# Patient Record
Sex: FEMALE | Race: WHITE | NOT HISPANIC OR LATINO | Employment: OTHER | URBAN - NONMETROPOLITAN AREA
[De-identification: names, ages, dates, MRNs, and addresses within clinical notes are randomized per-mention and may not be internally consistent; named-entity substitution may affect disease eponyms.]

---

## 2024-10-23 ENCOUNTER — OFFICE VISIT (OUTPATIENT)
Dept: CARDIOLOGY | Facility: CLINIC | Age: 59
End: 2024-10-23
Payer: MEDICARE

## 2024-10-23 VITALS
BODY MASS INDEX: 26.46 KG/M2 | SYSTOLIC BLOOD PRESSURE: 123 MMHG | WEIGHT: 155 LBS | HEART RATE: 68 BPM | DIASTOLIC BLOOD PRESSURE: 75 MMHG | OXYGEN SATURATION: 98 % | HEIGHT: 64 IN

## 2024-10-23 DIAGNOSIS — I25.10 CORONARY ARTERY DISEASE INVOLVING NATIVE HEART, UNSPECIFIED VESSEL OR LESION TYPE, UNSPECIFIED WHETHER ANGINA PRESENT: ICD-10-CM

## 2024-10-23 DIAGNOSIS — R07.9 CHEST PAIN, UNSPECIFIED TYPE: Primary | ICD-10-CM

## 2024-10-23 DIAGNOSIS — R06.02 SHORTNESS OF BREATH: ICD-10-CM

## 2024-10-23 PROCEDURE — 99204 OFFICE O/P NEW MOD 45 MIN: CPT | Performed by: PHYSICIAN ASSISTANT

## 2024-10-23 PROCEDURE — 93000 ELECTROCARDIOGRAM COMPLETE: CPT | Performed by: PHYSICIAN ASSISTANT

## 2024-10-23 RX ORDER — NITROGLYCERIN 0.4 MG/1
TABLET SUBLINGUAL
Qty: 25 TABLET | Refills: 11 | Status: SHIPPED | OUTPATIENT
Start: 2024-10-23

## 2024-10-23 RX ORDER — ALBUTEROL SULFATE 90 UG/1
INHALANT RESPIRATORY (INHALATION)
COMMUNITY
Start: 2024-09-18

## 2024-10-23 RX ORDER — GABAPENTIN 300 MG/1
CAPSULE ORAL DAILY
COMMUNITY
Start: 2024-09-25

## 2024-10-23 RX ORDER — AMLODIPINE BESYLATE 5 MG/1
5 TABLET ORAL DAILY
COMMUNITY

## 2024-10-23 RX ORDER — PRAVASTATIN SODIUM 40 MG
40 TABLET ORAL NIGHTLY
Qty: 90 TABLET | Refills: 3 | Status: SHIPPED | OUTPATIENT
Start: 2024-10-23

## 2024-10-23 RX ORDER — FAMOTIDINE 40 MG/1
TABLET, FILM COATED ORAL
COMMUNITY
Start: 2024-10-03

## 2024-10-23 RX ORDER — ASPIRIN 81 MG/1
81 TABLET ORAL DAILY
Qty: 30 TABLET | Refills: 5 | Status: SHIPPED | OUTPATIENT
Start: 2024-10-23

## 2024-10-23 NOTE — PROGRESS NOTES
Problem list     Subjective   Lucy Mac is a 59 y.o. female     Chief Complaint   Patient presents with    Establish Care     Transferred care. HX heart attack/stenting   Problem list  1.  Coronary artery disease  1.1 history of myocardial infarction's, inadequate data  1.2 cardiac catheterizations in 2013 by Dr. Marquez with stenting x 3, myocardial bridging noted.  Per patient report, inadequate data  2.  Hypertension  3.  Dyslipidemia    HPI    Patient is a 59-year-old female that presents to the office for evaluation.  She has history of coronary disease and previously seen Dr. Marquez here in [place].    Patient wants to establish care locally but has been experiencing worsening fatigue.  She describes concerned about her levels of fatigue which seem to be quite significant and have been that way since her myocardial infarction in the past.    Patient complains of occasional substernal discomfort in her chest.  She does not describe any severe symptoms but still noticeable discomfort accompanied with levels of dyspnea that is noticeable.  She does not describe PND or orthopnea.    She does not describe any significant palpitations.  She might occasionally sense a flutter type sensation but it is rare.  No complaints of dizziness, presyncope, or syncope.  She is stable otherwise.      Current Outpatient Medications on File Prior to Visit   Medication Sig Dispense Refill    albuterol sulfate  (90 Base) MCG/ACT inhaler       diphenhydrAMINE-APAP, sleep, (TYLENOL PM EXTRA STRENGTH PO) Take  by mouth.      famotidine (PEPCID) 40 MG tablet       gabapentin (NEURONTIN) 300 MG capsule Daily.      Zolpidem Tartrate (AMBIEN PO) Take  by mouth.      amLODIPine (NORVASC) 5 MG tablet Take 1 tablet by mouth Daily.       No current facility-administered medications on file prior to visit.       Patient has no known allergies.    Past Medical History:   Diagnosis Date    Chronic fatigue     Fibromyalgia     Heart  "attack        Social History     Socioeconomic History    Marital status:    Tobacco Use    Smoking status: Former     Types: Cigarettes    Smokeless tobacco: Never   Substance and Sexual Activity    Alcohol use: Yes    Drug use: Never    Sexual activity: Defer       Family History   Problem Relation Age of Onset    Heart attack Mother     Atrial fibrillation Father        Review of Systems   Constitutional: Negative.  Negative for activity change, appetite change, chills, fatigue and fever.   HENT: Negative.  Negative for congestion, sinus pressure and sinus pain.    Eyes: Negative.  Negative for visual disturbance.   Respiratory:  Positive for shortness of breath (asthma). Negative for apnea, cough, chest tightness and wheezing.    Cardiovascular:  Positive for chest pain (occasional- hx angina) and palpitations. Negative for leg swelling.   Gastrointestinal: Negative.  Negative for blood in stool.   Endocrine: Negative.  Negative for cold intolerance and heat intolerance.   Genitourinary:  Positive for hematuria (Current UTI).   Musculoskeletal: Negative.  Negative for gait problem.   Skin: Negative.  Negative for color change, rash and wound.   Allergic/Immunologic: Negative.  Negative for environmental allergies and food allergies.   Neurological: Negative.  Negative for dizziness, syncope, weakness, light-headedness, numbness and headaches.   Hematological: Negative.  Does not bruise/bleed easily.   Psychiatric/Behavioral: Negative.  Negative for sleep disturbance.        Objective   Vitals:    10/23/24 1034   BP: 123/75   BP Location: Right arm   Patient Position: Sitting   Cuff Size: Adult   Pulse: 68   SpO2: 98%   Weight: 70.3 kg (155 lb)   Height: 162.6 cm (64\")      /75 (BP Location: Right arm, Patient Position: Sitting, Cuff Size: Adult)   Pulse 68   Ht 162.6 cm (64\")   Wt 70.3 kg (155 lb)   SpO2 98%   BMI 26.61 kg/m²     Lab Results (most recent)       None            Physical " Exam  Vitals and nursing note reviewed.   Constitutional:       General: She is not in acute distress.     Appearance: Normal appearance. She is well-developed.   HENT:      Head: Normocephalic and atraumatic.   Eyes:      General: No scleral icterus.        Right eye: No discharge.         Left eye: No discharge.      Conjunctiva/sclera: Conjunctivae normal.   Neck:      Vascular: No carotid bruit.   Cardiovascular:      Rate and Rhythm: Normal rate and regular rhythm.      Heart sounds: Normal heart sounds. No murmur heard.     No friction rub. No gallop.   Pulmonary:      Effort: Pulmonary effort is normal. No respiratory distress.      Breath sounds: Normal breath sounds. No wheezing or rales.   Chest:      Chest wall: No tenderness.   Musculoskeletal:      Right lower leg: No edema.      Left lower leg: No edema.   Skin:     General: Skin is warm and dry.      Coloration: Skin is not pale.      Findings: No erythema or rash.   Neurological:      Mental Status: She is alert and oriented to person, place, and time.      Cranial Nerves: No cranial nerve deficit.   Psychiatric:         Behavior: Behavior normal.         Procedure     ECG 12 Lead    Date/Time: 10/23/2024 10:35 AM  Performed by: Nino York PA    Authorized by: Nino York PA  Comparison: not compared with previous ECG   Previous ECG: no previous ECG available  Comments: EKG demonstrates sinus rhythm at 66 bpm with no acute ST changes             Assessment & Plan     Problems Addressed this Visit          Cardiac and Vasculature    Chest pain - Primary    Relevant Orders    Adult Transthoracic Echo Complete W/ Cont if Necessary Per Protocol    Stress Test With Myocardial Perfusion One Day    Coronary artery disease involving native heart    Relevant Medications    amLODIPine (NORVASC) 5 MG tablet    nitroglycerin (NITROSTAT) 0.4 MG SL tablet    Other Relevant Orders    Adult Transthoracic Echo Complete W/ Cont if Necessary Per Protocol     Stress Test With Myocardial Perfusion One Day       Pulmonary and Pneumonias    Shortness of breath    Relevant Orders    Adult Transthoracic Echo Complete W/ Cont if Necessary Per Protocol    Stress Test With Myocardial Perfusion One Day     Diagnoses         Codes Comments    Chest pain, unspecified type    -  Primary ICD-10-CM: R07.9  ICD-9-CM: 786.50     Shortness of breath     ICD-10-CM: R06.02  ICD-9-CM: 786.05     Coronary artery disease involving native heart, unspecified vessel or lesion type, unspecified whether angina present     ICD-10-CM: I25.10  ICD-9-CM: 414.01           Recommendations  1.  Patient is a 59-year-old female presenting for evaluation.  She has known coronary disease with complaints of chest pain and dyspnea.  We would like to repeat testing to evaluate further.    2.  Nuclear stress test will be ordered for ischemia assessment.    3.  Echo will be ordered to evaluate LV systolic and diastolic performance, valvular structures etc.    4.  We are putting her back on aspirin daily.  We are putting her on statin therapy.  We discussed PCK S9 inhibitor such as Repatha.  Patient does not seem interested.  She apparently has tried atorvastatin and possibly rosuvastatin with adverse effects.  We will try pravastatin although we would recommend a higher dose statin.  We can repeat lipid parameters upon arrival.    5.  Any chest pain, not resolved with nitroglycerin, I recommend ER evaluation.  We will see her back for follow-up on above and recommend further.  Follow-up with primary as scheduled.         Patient did not bring med list or medicine bottles to appointment, med list has been reviewed and updated based on patient's knowledge of their meds.      Electronically signed by:

## 2024-10-23 NOTE — LETTER
October 23, 2024     Nishant Yi MD  [address] [place] [address] [address]  [address]  [facility] [id number]    Patient: Lucy Mac   YOB: 1965   Date of Visit: 10/23/2024       Dear Nishant Yi MD    Lucy Mac was in my office today. Below is a copy of my note.    If you have questions, please do not hesitate to call me. I look forward to following Lucy along with you.         Sincerely,        MATHEUS Sandra        CC: No Recipients    Problem list     Subjective  Lucy Mac is a 59 y.o. female     Chief Complaint   Patient presents with   • Establish Care     Transferred care. HX heart attack/stenting   Problem list  1.  Coronary artery disease  1.1 history of myocardial infarction's, inadequate data  1.2 cardiac catheterizations in 2013 by Dr. Marquez with stenting x 3, myocardial bridging noted.  Per patient report, inadequate data  2.  Hypertension  3.  Dyslipidemia    HPI    Patient is a 59-year-old female that presents to the office for evaluation.  She has history of coronary disease and previously seen Dr. Marquez here in [place].    Patient wants to establish care locally but has been experiencing worsening fatigue.  She describes concerned about her levels of fatigue which seem to be quite significant and have been that way since her myocardial infarction in the past.    Patient complains of occasional substernal discomfort in her chest.  She does not describe any severe symptoms but still noticeable discomfort accompanied with levels of dyspnea that is noticeable.  She does not describe PND or orthopnea.    She does not describe any significant palpitations.  She might occasionally sense a flutter type sensation but it is rare.  No complaints of dizziness, presyncope, or syncope.  She is stable otherwise.      Current Outpatient Medications on File Prior to Visit   Medication Sig Dispense Refill   • albuterol sulfate  (90 Base) MCG/ACT inhaler      • diphenhydrAMINE-APAP,  sleep, (TYLENOL PM EXTRA STRENGTH PO) Take  by mouth.     • famotidine (PEPCID) 40 MG tablet      • gabapentin (NEURONTIN) 300 MG capsule Daily.     • Zolpidem Tartrate (AMBIEN PO) Take  by mouth.     • amLODIPine (NORVASC) 5 MG tablet Take 1 tablet by mouth Daily.       No current facility-administered medications on file prior to visit.       Patient has no known allergies.    Past Medical History:   Diagnosis Date   • Chronic fatigue    • Fibromyalgia    • Heart attack        Social History     Socioeconomic History   • Marital status:    Tobacco Use   • Smoking status: Former     Types: Cigarettes   • Smokeless tobacco: Never   Substance and Sexual Activity   • Alcohol use: Yes   • Drug use: Never   • Sexual activity: Defer       Family History   Problem Relation Age of Onset   • Heart attack Mother    • Atrial fibrillation Father        Review of Systems   Constitutional: Negative.  Negative for activity change, appetite change, chills, fatigue and fever.   HENT: Negative.  Negative for congestion, sinus pressure and sinus pain.    Eyes: Negative.  Negative for visual disturbance.   Respiratory:  Positive for shortness of breath (asthma). Negative for apnea, cough, chest tightness and wheezing.    Cardiovascular:  Positive for chest pain (occasional- hx angina) and palpitations. Negative for leg swelling.   Gastrointestinal: Negative.  Negative for blood in stool.   Endocrine: Negative.  Negative for cold intolerance and heat intolerance.   Genitourinary:  Positive for hematuria (Current UTI).   Musculoskeletal: Negative.  Negative for gait problem.   Skin: Negative.  Negative for color change, rash and wound.   Allergic/Immunologic: Negative.  Negative for environmental allergies and food allergies.   Neurological: Negative.  Negative for dizziness, syncope, weakness, light-headedness, numbness and headaches.   Hematological: Negative.  Does not bruise/bleed easily.   Psychiatric/Behavioral: Negative.   "Negative for sleep disturbance.        Objective  Vitals:    10/23/24 1034   BP: 123/75   BP Location: Right arm   Patient Position: Sitting   Cuff Size: Adult   Pulse: 68   SpO2: 98%   Weight: 70.3 kg (155 lb)   Height: 162.6 cm (64\")      /75 (BP Location: Right arm, Patient Position: Sitting, Cuff Size: Adult)   Pulse 68   Ht 162.6 cm (64\")   Wt 70.3 kg (155 lb)   SpO2 98%   BMI 26.61 kg/m²     Lab Results (most recent)       None            Physical Exam  Vitals and nursing note reviewed.   Constitutional:       General: She is not in acute distress.     Appearance: Normal appearance. She is well-developed.   HENT:      Head: Normocephalic and atraumatic.   Eyes:      General: No scleral icterus.        Right eye: No discharge.         Left eye: No discharge.      Conjunctiva/sclera: Conjunctivae normal.   Neck:      Vascular: No carotid bruit.   Cardiovascular:      Rate and Rhythm: Normal rate and regular rhythm.      Heart sounds: Normal heart sounds. No murmur heard.     No friction rub. No gallop.   Pulmonary:      Effort: Pulmonary effort is normal. No respiratory distress.      Breath sounds: Normal breath sounds. No wheezing or rales.   Chest:      Chest wall: No tenderness.   Musculoskeletal:      Right lower leg: No edema.      Left lower leg: No edema.   Skin:     General: Skin is warm and dry.      Coloration: Skin is not pale.      Findings: No erythema or rash.   Neurological:      Mental Status: She is alert and oriented to person, place, and time.      Cranial Nerves: No cranial nerve deficit.   Psychiatric:         Behavior: Behavior normal.         Procedure    ECG 12 Lead    Date/Time: 10/23/2024 10:35 AM  Performed by: Nino York PA    Authorized by: Nino York PA  Comparison: not compared with previous ECG   Previous ECG: no previous ECG available  Comments: EKG demonstrates sinus rhythm at 66 bpm with no acute ST changes             Assessment & Plan    Problems " Addressed this Visit          Cardiac and Vasculature    Chest pain - Primary    Relevant Orders    Adult Transthoracic Echo Complete W/ Cont if Necessary Per Protocol    Stress Test With Myocardial Perfusion One Day    Coronary artery disease involving native heart    Relevant Medications    amLODIPine (NORVASC) 5 MG tablet    nitroglycerin (NITROSTAT) 0.4 MG SL tablet    Other Relevant Orders    Adult Transthoracic Echo Complete W/ Cont if Necessary Per Protocol    Stress Test With Myocardial Perfusion One Day       Pulmonary and Pneumonias    Shortness of breath    Relevant Orders    Adult Transthoracic Echo Complete W/ Cont if Necessary Per Protocol    Stress Test With Myocardial Perfusion One Day     Diagnoses         Codes Comments    Chest pain, unspecified type    -  Primary ICD-10-CM: R07.9  ICD-9-CM: 786.50     Shortness of breath     ICD-10-CM: R06.02  ICD-9-CM: 786.05     Coronary artery disease involving native heart, unspecified vessel or lesion type, unspecified whether angina present     ICD-10-CM: I25.10  ICD-9-CM: 414.01           Recommendations  1.  Patient is a 59-year-old female presenting for evaluation.  She has known coronary disease with complaints of chest pain and dyspnea.  We would like to repeat testing to evaluate further.    2.  Nuclear stress test will be ordered for ischemia assessment.    3.  Echo will be ordered to evaluate LV systolic and diastolic performance, valvular structures etc.    4.  We are putting her back on aspirin daily.  We are putting her on statin therapy.  We discussed PCK S9 inhibitor such as Repatha.  Patient does not seem interested.  She apparently has tried atorvastatin and possibly rosuvastatin with adverse effects.  We will try pravastatin although we would recommend a higher dose statin.  We can repeat lipid parameters upon arrival.    5.  Any chest pain, not resolved with nitroglycerin, I recommend ER evaluation.  We will see her back for follow-up on  above and recommend further.  Follow-up with primary as scheduled.         Patient did not bring med list or medicine bottles to appointment, med list has been reviewed and updated based on patient's knowledge of their meds.      Electronically signed by:

## 2025-01-31 ENCOUNTER — HOSPITAL ENCOUNTER (OUTPATIENT)
Dept: CARDIOLOGY | Facility: HOSPITAL | Age: 60
Discharge: HOME | End: 2025-01-31
Payer: COMMERCIAL

## 2025-01-31 DIAGNOSIS — R06.02 SHORTNESS OF BREATH: ICD-10-CM

## 2025-01-31 DIAGNOSIS — I25.10 CORONARY ARTERY DISEASE INVOLVING NATIVE HEART, UNSPECIFIED VESSEL OR LESION TYPE, UNSPECIFIED WHETHER ANGINA PRESENT: ICD-10-CM

## 2025-01-31 DIAGNOSIS — R07.9 CHEST PAIN, UNSPECIFIED TYPE: ICD-10-CM

## 2025-01-31 LAB
AV MEAN PRESS GRAD SYS DOP V1V2: 4.1 MMHG
AV VMAX SYS DOP: 135.7 CM/SEC
BH CV ECHO MEAS - ACS: 1.97 CM
BH CV ECHO MEAS - AO MAX PG: 7.4 MMHG
BH CV ECHO MEAS - AO ROOT DIAM: 2.9 CM
BH CV ECHO MEAS - AO V2 VTI: 34.2 CM
BH CV ECHO MEAS - EDV(CUBED): 88.1 ML
BH CV ECHO MEAS - EDV(MOD-SP4): 126 ML
BH CV ECHO MEAS - EF(MOD-SP4): 61.3 %
BH CV ECHO MEAS - ESV(CUBED): 21.7 ML
BH CV ECHO MEAS - ESV(MOD-SP4): 48.7 ML
BH CV ECHO MEAS - FS: 37.3 %
BH CV ECHO MEAS - IVS/LVPW: 1.09 CM
BH CV ECHO MEAS - IVSD: 0.88 CM
BH CV ECHO MEAS - LA DIMENSION: 3.5 CM
BH CV ECHO MEAS - LAT PEAK E' VEL: 11.9 CM/SEC
BH CV ECHO MEAS - LV DIASTOLIC VOL/BSA (35-75): 71.8 CM2
BH CV ECHO MEAS - LV MASS(C)D: 118.7 GRAMS
BH CV ECHO MEAS - LV SYSTOLIC VOL/BSA (12-30): 27.7 CM2
BH CV ECHO MEAS - LVIDD: 4.5 CM
BH CV ECHO MEAS - LVIDS: 2.8 CM
BH CV ECHO MEAS - LVPWD: 0.8 CM
BH CV ECHO MEAS - MED PEAK E' VEL: 7.4 CM/SEC
BH CV ECHO MEAS - MV A MAX VEL: 59.6 CM/SEC
BH CV ECHO MEAS - MV DEC TIME: 0.3 SEC
BH CV ECHO MEAS - MV E MAX VEL: 66.8 CM/SEC
BH CV ECHO MEAS - MV E/A: 1.12
BH CV ECHO MEAS - RAP SYSTOLE: 10 MMHG
BH CV ECHO MEAS - RVDD: 3 CM
BH CV ECHO MEAS - RVSP: 35.2 MMHG
BH CV ECHO MEAS - SV(MOD-SP4): 77.3 ML
BH CV ECHO MEAS - SVI(MOD-SP4): 44 ML/M2
BH CV ECHO MEAS - TR MAX PG: 25.2 MMHG
BH CV ECHO MEAS - TR MAX VEL: 251.1 CM/SEC
BH CV ECHO MEASUREMENTS AVERAGE E/E' RATIO: 6.92
LEFT ATRIUM VOLUME INDEX: 26.9 ML/M2
LV EF 3D SEGMENTATION: 62 %

## 2025-01-31 PROCEDURE — 34310000005 TECHNETIUM SESTAMIBI: Performed by: INTERNAL MEDICINE

## 2025-01-31 PROCEDURE — A9500 TC99M SESTAMIBI: HCPCS | Performed by: INTERNAL MEDICINE

## 2025-01-31 PROCEDURE — 78452 HT MUSCLE IMAGE SPECT MULT: CPT

## 2025-01-31 PROCEDURE — 93306 TTE W/DOPPLER COMPLETE: CPT

## 2025-01-31 PROCEDURE — 93017 CV STRESS TEST TRACING ONLY: CPT

## 2025-01-31 PROCEDURE — 25010000002 REGADENOSON 0.4 MG/5ML SOLUTION: Performed by: INTERNAL MEDICINE

## 2025-01-31 RX ORDER — REGADENOSON 0.08 MG/ML
0.4 INJECTION, SOLUTION INTRAVENOUS
Status: COMPLETED | OUTPATIENT
Start: 2025-01-31 | End: 2025-01-31

## 2025-01-31 RX ADMIN — REGADENOSON 0.4 MG: 0.08 INJECTION, SOLUTION INTRAVENOUS at 09:38

## 2025-01-31 RX ADMIN — TECHNETIUM TC 99M SESTAMIBI 1 DOSE: 1 INJECTION INTRAVENOUS at 09:38

## 2025-01-31 RX ADMIN — TECHNETIUM TC 99M SESTAMIBI 1 DOSE: 1 INJECTION INTRAVENOUS at 08:18

## 2025-02-02 LAB
BH CV REST NUCLEAR ISOTOPE DOSE: 10 MCI
BH CV STRESS COMMENTS STAGE 1: NORMAL
BH CV STRESS DOSE REGADENOSON STAGE 1: 0.4
BH CV STRESS DURATION MIN STAGE 1: 0
BH CV STRESS DURATION SEC STAGE 1: 10
BH CV STRESS NUCLEAR ISOTOPE DOSE: 30 MCI
BH CV STRESS PROTOCOL 1: NORMAL
BH CV STRESS RECOVERY BP: NORMAL MMHG
BH CV STRESS RECOVERY HR: 78 BPM
BH CV STRESS STAGE 1: 1
MAXIMAL PREDICTED HEART RATE: 162 BPM
PERCENT MAX PREDICTED HR: 53.09 %
STRESS BASELINE BP: NORMAL MMHG
STRESS BASELINE HR: 62 BPM
STRESS PERCENT HR: 62 %
STRESS POST PEAK BP: NORMAL MMHG
STRESS POST PEAK HR: 86 BPM
STRESS TARGET HR: 138 BPM

## 2025-02-03 ENCOUNTER — TELEPHONE (OUTPATIENT)
Dept: CARDIOLOGY | Facility: CLINIC | Age: 60
End: 2025-02-03
Payer: COMMERCIAL

## 2025-02-03 NOTE — TELEPHONE ENCOUNTER
--Stress Test With Myocardial Perfusion One Day --- Message from Nino York sent at 2/3/2025  8:59 AM EST -----  1 to 2-week follow-up      Patient aware will be called with melina time and date

## 2025-02-10 ENCOUNTER — TELEPHONE (OUTPATIENT)
Dept: CARDIOLOGY | Facility: CLINIC | Age: 60
End: 2025-02-10
Payer: COMMERCIAL

## 2025-02-10 NOTE — TELEPHONE ENCOUNTER
RELAY  ECHO  Called patient to notify of no acute findings or abnormalities. Keep follow up as scheduled. If you have any problem between now and then give our office a call.   ----- Message from Saima HUNT sent at 2/10/2025 10:54 AM EST -----    ----- Message -----  From: Nino York PA  Sent: 2/10/2025  10:30 AM EST  To: Saima Ding MA    Routine follow-up

## 2025-02-10 NOTE — TELEPHONE ENCOUNTER
Name: OBDULIO VALLEJO      Relationship: SELF      Best Callback Number: [phone]      HUB PROVIDED THE RELAY MESSAGE FROM THE OFFICE      PATIENT: VOICED UNDERSTANDING AND HAS NO FURTHER QUESTIONS AT THIS TIME    ADDITIONAL INFORMATION:

## 2025-02-12 ENCOUNTER — OFFICE VISIT (OUTPATIENT)
Dept: CARDIOLOGY | Facility: CLINIC | Age: 60
End: 2025-02-12
Payer: MEDICARE

## 2025-02-12 VITALS
DIASTOLIC BLOOD PRESSURE: 75 MMHG | HEART RATE: 76 BPM | SYSTOLIC BLOOD PRESSURE: 119 MMHG | OXYGEN SATURATION: 97 % | WEIGHT: 157 LBS | BODY MASS INDEX: 26.95 KG/M2

## 2025-02-12 DIAGNOSIS — R07.9 CHEST PAIN, UNSPECIFIED TYPE: Primary | ICD-10-CM

## 2025-02-12 DIAGNOSIS — I10 PRIMARY HYPERTENSION: ICD-10-CM

## 2025-02-12 DIAGNOSIS — I25.10 CORONARY ARTERY DISEASE INVOLVING NATIVE HEART, UNSPECIFIED VESSEL OR LESION TYPE, UNSPECIFIED WHETHER ANGINA PRESENT: ICD-10-CM

## 2025-02-12 PROCEDURE — 3074F SYST BP LT 130 MM HG: CPT | Performed by: PHYSICIAN ASSISTANT

## 2025-02-12 PROCEDURE — 3078F DIAST BP <80 MM HG: CPT | Performed by: PHYSICIAN ASSISTANT

## 2025-02-12 PROCEDURE — 99214 OFFICE O/P EST MOD 30 MIN: CPT | Performed by: PHYSICIAN ASSISTANT

## 2025-02-12 RX ORDER — PRAVASTATIN SODIUM 20 MG
20 TABLET ORAL NIGHTLY
Qty: 90 TABLET | Refills: 3 | Status: SHIPPED | OUTPATIENT
Start: 2025-02-12

## 2025-02-12 NOTE — LETTER
February 12, 2025     Nishant Yi MD  [address] [place] [address]  [address]  [facility] [address]    Patient: Lucy Mac   YOB: 1965   Date of Visit: 2/12/2025       Dear Nishant Yi MD    Lucy Mac was in my office today. Below is a copy of my note.    If you have questions, please do not hesitate to call me. I look forward to following Lucy along with you.         Sincerely,        MATHEUS Sandra        CC: No Recipients    Problem list     Subjective  Lucy Mac is a 59 y.o. female     Chief Complaint   Patient presents with   • Abnormal testing     Chest pain   Problem list  1.  Coronary artery disease  1.1 history of myocardial infarction's, inadequate data  1.2 cardiac catheterizations in 2013 by Dr. Marquez with stenting x 3, myocardial bridging noted.  Per patient report, inadequate data  1.3 stress test February 2025 with a large defect involving anteroseptal, anterior and anterolateral wall.  Attenuation versus ischemia with normal systolic function noted  1.4  continued symptoms on antianginal therapy  2.  Hypertension  3.  Dyslipidemia    HPI    Patient is a 59-year-old female presenting to the office to be evaluated.  She has known coronary disease and history of stenting in the past.  She presented to the office complaining of chest discomfort and symptoms.  Because of it, we scheduled for testing which she is here today for follow-up.    She continues to feel discomfort in the substernal region.  She does not describe taking nitroglycerin but she continues to feel this substernal discomfort despite being on antianginal therapy.  Patient does not describe any progressive or severe exertional dyspnea.  She does not describe PND, orthopnea, or edema.    She may occasionally palpitate or sense of fluttering type sensation.  She does not describe any strokelike symptoms.    Overall, patient is stable.    Current Outpatient Medications on File Prior to Visit    Medication Sig Dispense Refill   • albuterol sulfate  (90 Base) MCG/ACT inhaler      • amLODIPine (NORVASC) 5 MG tablet Take 1 tablet by mouth Daily.     • aspirin 81 MG EC tablet Take 1 tablet by mouth Daily. 30 tablet 5   • diphenhydrAMINE-APAP, sleep, (TYLENOL PM EXTRA STRENGTH PO) Take  by mouth.     • famotidine (PEPCID) 40 MG tablet      • gabapentin (NEURONTIN) 300 MG capsule Daily.     • nitroglycerin (NITROSTAT) 0.4 MG SL tablet 1 under the tongue as needed for angina, may repeat q5mins for up three doses 25 tablet 11   • Zolpidem Tartrate (AMBIEN PO) Take  by mouth.     • [DISCONTINUED] pravastatin (Pravachol) 40 MG tablet Take 1 tablet by mouth Every Night. 90 tablet 3     No current facility-administered medications on file prior to visit.       Patient has no known allergies.    Past Medical History:   Diagnosis Date   • Chronic fatigue    • Fibromyalgia    • Heart attack        Social History     Socioeconomic History   • Marital status:    Tobacco Use   • Smoking status: Former     Types: Cigarettes   • Smokeless tobacco: Never   Substance and Sexual Activity   • Alcohol use: Yes   • Drug use: Never   • Sexual activity: Defer       Family History   Problem Relation Age of Onset   • Heart attack Mother    • Atrial fibrillation Father        Review of Systems   Constitutional:  Negative for activity change, appetite change, chills, fatigue and fever.   HENT: Negative.  Negative for congestion, sinus pressure and sinus pain.    Eyes: Negative.  Negative for visual disturbance.   Respiratory: Negative.  Negative for apnea, cough, chest tightness, shortness of breath and wheezing.    Cardiovascular:  Positive for chest pain and palpitations. Negative for leg swelling.   Gastrointestinal: Negative.  Negative for blood in stool.   Endocrine: Negative.  Negative for cold intolerance and heat intolerance.   Genitourinary: Negative.  Negative for hematuria.   Musculoskeletal: Negative.  Negative  for gait problem.   Skin: Negative.  Negative for color change, rash and wound.   Allergic/Immunologic: Negative.  Negative for environmental allergies and food allergies.   Neurological:  Negative for dizziness, syncope, weakness, light-headedness, numbness and headaches.   Hematological: Negative.  Does not bruise/bleed easily.   Psychiatric/Behavioral: Negative.  Negative for sleep disturbance.        Objective  Vitals:    02/12/25 0837   BP: 119/75   BP Location: Right arm   Patient Position: Sitting   Cuff Size: Adult   Pulse: 76   SpO2: 97%   Weight: 71.2 kg (157 lb)      /75 (BP Location: Right arm, Patient Position: Sitting, Cuff Size: Adult)   Pulse 76   Wt 71.2 kg (157 lb)   SpO2 97%   BMI 26.95 kg/m²     Lab Results (most recent)       None            Physical Exam  Vitals and nursing note reviewed.   Constitutional:       General: She is not in acute distress.     Appearance: Normal appearance. She is well-developed.   HENT:      Head: Normocephalic and atraumatic.   Eyes:      General: No scleral icterus.        Right eye: No discharge.         Left eye: No discharge.      Conjunctiva/sclera: Conjunctivae normal.   Neck:      Vascular: No carotid bruit.   Cardiovascular:      Rate and Rhythm: Normal rate and regular rhythm.      Heart sounds: Normal heart sounds. No murmur heard.     No friction rub. No gallop.   Pulmonary:      Effort: Pulmonary effort is normal. No respiratory distress.      Breath sounds: Normal breath sounds. No wheezing or rales.   Chest:      Chest wall: No tenderness.   Musculoskeletal:      Right lower leg: No edema.      Left lower leg: No edema.   Skin:     General: Skin is warm and dry.      Coloration: Skin is not pale.      Findings: No erythema or rash.   Neurological:      Mental Status: She is alert and oriented to person, place, and time.      Cranial Nerves: No cranial nerve deficit.   Psychiatric:         Behavior: Behavior normal.          Procedure  Procedures       Assessment & Plan    Problems Addressed this Visit          Cardiac and Vasculature    Chest pain - Primary    Relevant Orders    Case Request Cath Lab: Coronary angiography (Completed)    Coronary artery disease involving native heart    Relevant Orders    Case Request Cath Lab: Coronary angiography (Completed)    Primary hypertension    Relevant Orders    Case Request Cath Lab: Coronary angiography (Completed)     Diagnoses         Codes Comments    Chest pain, unspecified type    -  Primary ICD-10-CM: R07.9  ICD-9-CM: 786.50     Coronary artery disease involving native heart, unspecified vessel or lesion type, unspecified whether angina present     ICD-10-CM: I25.10  ICD-9-CM: 414.01     Primary hypertension     ICD-10-CM: I10  ICD-9-CM: 401.9         Recommendations  1.  Patient is a 59-year-old female with chest discomfort with a large anterior, anteroseptal and anterolateral wall defect noted on stress testing with patient continuing to have symptoms on antianginal therapy.  Because of it, she would like cardiac catheterization and have it performed in [place].  We will try to make arrangements.    2.  Patient has had significant issues with statin therapy.  I am decreasing it to 20 mg.  We discussed PCKS9 but she is not interested.  We have her on a milder statin but she is having issues with it.  We will try to decrease it.  We may have to ultimately add Zetia.  We will decrease and evaluate response.    3.  Patient's blood pressure controlled on current medical regimen.  I will make no changes.    4.  We will see her back for follow-up after catheterization to recommend further.  If symptoms worsen, want her to call the office.  Follow-up with primary as scheduled.             Patient did not bring med list or medicine bottles to appointment, med list has been reviewed and updated based on patient's knowledge of their meds.      Electronically signed by:

## 2025-02-12 NOTE — H&P (VIEW-ONLY)
Problem list     Subjective   Lucy Mac is a 59 y.o. female     Chief Complaint   Patient presents with    Abnormal testing     Chest pain   Problem list  1.  Coronary artery disease  1.1 history of myocardial infarction's, inadequate data  1.2 cardiac catheterizations in 2013 by Dr. Marquez with stenting x 3, myocardial bridging noted.  Per patient report, inadequate data  1.3 stress test February 2025 with a large defect involving anteroseptal, anterior and anterolateral wall.  Attenuation versus ischemia with normal systolic function noted  1.4  continued symptoms on antianginal therapy  2.  Hypertension  3.  Dyslipidemia    HPI    Patient is a 59-year-old female presenting to the office to be evaluated.  She has known coronary disease and history of stenting in the past.  She presented to the office complaining of chest discomfort and symptoms.  Because of it, we scheduled for testing which she is here today for follow-up.    She continues to feel discomfort in the substernal region.  She does not describe taking nitroglycerin but she continues to feel this substernal discomfort despite being on antianginal therapy.  Patient does not describe any progressive or severe exertional dyspnea.  She does not describe PND, orthopnea, or edema.    She may occasionally palpitate or sense of fluttering type sensation.  She does not describe any strokelike symptoms.    Overall, patient is stable.    Current Outpatient Medications on File Prior to Visit   Medication Sig Dispense Refill    albuterol sulfate  (90 Base) MCG/ACT inhaler       amLODIPine (NORVASC) 5 MG tablet Take 1 tablet by mouth Daily.      aspirin 81 MG EC tablet Take 1 tablet by mouth Daily. 30 tablet 5    diphenhydrAMINE-APAP, sleep, (TYLENOL PM EXTRA STRENGTH PO) Take  by mouth.      famotidine (PEPCID) 40 MG tablet       gabapentin (NEURONTIN) 300 MG capsule Daily.      nitroglycerin (NITROSTAT) 0.4 MG SL tablet 1 under the tongue as needed for  angina, may repeat q5mins for up three doses 25 tablet 11    Zolpidem Tartrate (AMBIEN PO) Take  by mouth.      [DISCONTINUED] pravastatin (Pravachol) 40 MG tablet Take 1 tablet by mouth Every Night. 90 tablet 3     No current facility-administered medications on file prior to visit.       Patient has no known allergies.    Past Medical History:   Diagnosis Date    Chronic fatigue     Fibromyalgia     Heart attack        Social History     Socioeconomic History    Marital status:    Tobacco Use    Smoking status: Former     Types: Cigarettes    Smokeless tobacco: Never   Substance and Sexual Activity    Alcohol use: Yes    Drug use: Never    Sexual activity: Defer       Family History   Problem Relation Age of Onset    Heart attack Mother     Atrial fibrillation Father        Review of Systems   Constitutional:  Negative for activity change, appetite change, chills, fatigue and fever.   HENT: Negative.  Negative for congestion, sinus pressure and sinus pain.    Eyes: Negative.  Negative for visual disturbance.   Respiratory: Negative.  Negative for apnea, cough, chest tightness, shortness of breath and wheezing.    Cardiovascular:  Positive for chest pain and palpitations. Negative for leg swelling.   Gastrointestinal: Negative.  Negative for blood in stool.   Endocrine: Negative.  Negative for cold intolerance and heat intolerance.   Genitourinary: Negative.  Negative for hematuria.   Musculoskeletal: Negative.  Negative for gait problem.   Skin: Negative.  Negative for color change, rash and wound.   Allergic/Immunologic: Negative.  Negative for environmental allergies and food allergies.   Neurological:  Negative for dizziness, syncope, weakness, light-headedness, numbness and headaches.   Hematological: Negative.  Does not bruise/bleed easily.   Psychiatric/Behavioral: Negative.  Negative for sleep disturbance.        Objective   Vitals:    02/12/25 0837   BP: 119/75   BP Location: Right arm   Patient  Position: Sitting   Cuff Size: Adult   Pulse: 76   SpO2: 97%   Weight: 71.2 kg (157 lb)      /75 (BP Location: Right arm, Patient Position: Sitting, Cuff Size: Adult)   Pulse 76   Wt 71.2 kg (157 lb)   SpO2 97%   BMI 26.95 kg/m²     Lab Results (most recent)       None            Physical Exam  Vitals and nursing note reviewed.   Constitutional:       General: She is not in acute distress.     Appearance: Normal appearance. She is well-developed.   HENT:      Head: Normocephalic and atraumatic.   Eyes:      General: No scleral icterus.        Right eye: No discharge.         Left eye: No discharge.      Conjunctiva/sclera: Conjunctivae normal.   Neck:      Vascular: No carotid bruit.   Cardiovascular:      Rate and Rhythm: Normal rate and regular rhythm.      Heart sounds: Normal heart sounds. No murmur heard.     No friction rub. No gallop.   Pulmonary:      Effort: Pulmonary effort is normal. No respiratory distress.      Breath sounds: Normal breath sounds. No wheezing or rales.   Chest:      Chest wall: No tenderness.   Musculoskeletal:      Right lower leg: No edema.      Left lower leg: No edema.   Skin:     General: Skin is warm and dry.      Coloration: Skin is not pale.      Findings: No erythema or rash.   Neurological:      Mental Status: She is alert and oriented to person, place, and time.      Cranial Nerves: No cranial nerve deficit.   Psychiatric:         Behavior: Behavior normal.         Procedure   Procedures       Assessment & Plan     Problems Addressed this Visit          Cardiac and Vasculature    Chest pain - Primary    Relevant Orders    Case Request Cath Lab: Coronary angiography (Completed)    Coronary artery disease involving native heart    Relevant Orders    Case Request Cath Lab: Coronary angiography (Completed)    Primary hypertension    Relevant Orders    Case Request Cath Lab: Coronary angiography (Completed)     Diagnoses         Codes Comments    Chest pain, unspecified  type    -  Primary ICD-10-CM: R07.9  ICD-9-CM: 786.50     Coronary artery disease involving native heart, unspecified vessel or lesion type, unspecified whether angina present     ICD-10-CM: I25.10  ICD-9-CM: 414.01     Primary hypertension     ICD-10-CM: I10  ICD-9-CM: 401.9         Recommendations  1.  Patient is a 59-year-old female with chest discomfort with a large anterior, anteroseptal and anterolateral wall defect noted on stress testing with patient continuing to have symptoms on antianginal therapy.  Because of it, she would like cardiac catheterization and have it performed in [place].  We will try to make arrangements.    2.  Patient has had significant issues with statin therapy.  I am decreasing it to 20 mg.  We discussed PCKS9 but she is not interested.  We have her on a milder statin but she is having issues with it.  We will try to decrease it.  We may have to ultimately add Zetia.  We will decrease and evaluate response.    3.  Patient's blood pressure controlled on current medical regimen.  I will make no changes.    4.  We will see her back for follow-up after catheterization to recommend further.  If symptoms worsen, want her to call the office.  Follow-up with primary as scheduled.             Patient did not bring med list or medicine bottles to appointment, med list has been reviewed and updated based on patient's knowledge of their meds.      Electronically signed by:

## 2025-02-12 NOTE — PROGRESS NOTES
Problem list     Subjective   Lucy Mac is a 59 y.o. female     Chief Complaint   Patient presents with    Abnormal testing     Chest pain   Problem list  1.  Coronary artery disease  1.1 history of myocardial infarction's, inadequate data  1.2 cardiac catheterizations in 2013 by Dr. Marquez with stenting x 3, myocardial bridging noted.  Per patient report, inadequate data  1.3 stress test February 2025 with a large defect involving anteroseptal, anterior and anterolateral wall.  Attenuation versus ischemia with normal systolic function noted  1.4  continued symptoms on antianginal therapy  2.  Hypertension  3.  Dyslipidemia    HPI    Patient is a 59-year-old female presenting to the office to be evaluated.  She has known coronary disease and history of stenting in the past.  She presented to the office complaining of chest discomfort and symptoms.  Because of it, we scheduled for testing which she is here today for follow-up.    She continues to feel discomfort in the substernal region.  She does not describe taking nitroglycerin but she continues to feel this substernal discomfort despite being on antianginal therapy.  Patient does not describe any progressive or severe exertional dyspnea.  She does not describe PND, orthopnea, or edema.    She may occasionally palpitate or sense of fluttering type sensation.  She does not describe any strokelike symptoms.    Overall, patient is stable.    Current Outpatient Medications on File Prior to Visit   Medication Sig Dispense Refill    albuterol sulfate  (90 Base) MCG/ACT inhaler       amLODIPine (NORVASC) 5 MG tablet Take 1 tablet by mouth Daily.      aspirin 81 MG EC tablet Take 1 tablet by mouth Daily. 30 tablet 5    diphenhydrAMINE-APAP, sleep, (TYLENOL PM EXTRA STRENGTH PO) Take  by mouth.      famotidine (PEPCID) 40 MG tablet       gabapentin (NEURONTIN) 300 MG capsule Daily.      nitroglycerin (NITROSTAT) 0.4 MG SL tablet 1 under the tongue as needed for  angina, may repeat q5mins for up three doses 25 tablet 11    Zolpidem Tartrate (AMBIEN PO) Take  by mouth.      [DISCONTINUED] pravastatin (Pravachol) 40 MG tablet Take 1 tablet by mouth Every Night. 90 tablet 3     No current facility-administered medications on file prior to visit.       Patient has no known allergies.    Past Medical History:   Diagnosis Date    Chronic fatigue     Fibromyalgia     Heart attack        Social History     Socioeconomic History    Marital status:    Tobacco Use    Smoking status: Former     Types: Cigarettes    Smokeless tobacco: Never   Substance and Sexual Activity    Alcohol use: Yes    Drug use: Never    Sexual activity: Defer       Family History   Problem Relation Age of Onset    Heart attack Mother     Atrial fibrillation Father        Review of Systems   Constitutional:  Negative for activity change, appetite change, chills, fatigue and fever.   HENT: Negative.  Negative for congestion, sinus pressure and sinus pain.    Eyes: Negative.  Negative for visual disturbance.   Respiratory: Negative.  Negative for apnea, cough, chest tightness, shortness of breath and wheezing.    Cardiovascular:  Positive for chest pain and palpitations. Negative for leg swelling.   Gastrointestinal: Negative.  Negative for blood in stool.   Endocrine: Negative.  Negative for cold intolerance and heat intolerance.   Genitourinary: Negative.  Negative for hematuria.   Musculoskeletal: Negative.  Negative for gait problem.   Skin: Negative.  Negative for color change, rash and wound.   Allergic/Immunologic: Negative.  Negative for environmental allergies and food allergies.   Neurological:  Negative for dizziness, syncope, weakness, light-headedness, numbness and headaches.   Hematological: Negative.  Does not bruise/bleed easily.   Psychiatric/Behavioral: Negative.  Negative for sleep disturbance.        Objective   Vitals:    02/12/25 0837   BP: 119/75   BP Location: Right arm   Patient  Position: Sitting   Cuff Size: Adult   Pulse: 76   SpO2: 97%   Weight: 71.2 kg (157 lb)      /75 (BP Location: Right arm, Patient Position: Sitting, Cuff Size: Adult)   Pulse 76   Wt 71.2 kg (157 lb)   SpO2 97%   BMI 26.95 kg/m²     Lab Results (most recent)       None            Physical Exam  Vitals and nursing note reviewed.   Constitutional:       General: She is not in acute distress.     Appearance: Normal appearance. She is well-developed.   HENT:      Head: Normocephalic and atraumatic.   Eyes:      General: No scleral icterus.        Right eye: No discharge.         Left eye: No discharge.      Conjunctiva/sclera: Conjunctivae normal.   Neck:      Vascular: No carotid bruit.   Cardiovascular:      Rate and Rhythm: Normal rate and regular rhythm.      Heart sounds: Normal heart sounds. No murmur heard.     No friction rub. No gallop.   Pulmonary:      Effort: Pulmonary effort is normal. No respiratory distress.      Breath sounds: Normal breath sounds. No wheezing or rales.   Chest:      Chest wall: No tenderness.   Musculoskeletal:      Right lower leg: No edema.      Left lower leg: No edema.   Skin:     General: Skin is warm and dry.      Coloration: Skin is not pale.      Findings: No erythema or rash.   Neurological:      Mental Status: She is alert and oriented to person, place, and time.      Cranial Nerves: No cranial nerve deficit.   Psychiatric:         Behavior: Behavior normal.         Procedure   Procedures       Assessment & Plan     Problems Addressed this Visit          Cardiac and Vasculature    Chest pain - Primary    Relevant Orders    Case Request Cath Lab: Coronary angiography (Completed)    Coronary artery disease involving native heart    Relevant Orders    Case Request Cath Lab: Coronary angiography (Completed)    Primary hypertension    Relevant Orders    Case Request Cath Lab: Coronary angiography (Completed)     Diagnoses         Codes Comments    Chest pain, unspecified  type    -  Primary ICD-10-CM: R07.9  ICD-9-CM: 786.50     Coronary artery disease involving native heart, unspecified vessel or lesion type, unspecified whether angina present     ICD-10-CM: I25.10  ICD-9-CM: 414.01     Primary hypertension     ICD-10-CM: I10  ICD-9-CM: 401.9         Recommendations  1.  Patient is a 59-year-old female with chest discomfort with a large anterior, anteroseptal and anterolateral wall defect noted on stress testing with patient continuing to have symptoms on antianginal therapy.  Because of it, she would like cardiac catheterization and have it performed in [place].  We will try to make arrangements.    2.  Patient has had significant issues with statin therapy.  I am decreasing it to 20 mg.  We discussed PCKS9 but she is not interested.  We have her on a milder statin but she is having issues with it.  We will try to decrease it.  We may have to ultimately add Zetia.  We will decrease and evaluate response.    3.  Patient's blood pressure controlled on current medical regimen.  I will make no changes.    4.  We will see her back for follow-up after catheterization to recommend further.  If symptoms worsen, want her to call the office.  Follow-up with primary as scheduled.             Patient did not bring med list or medicine bottles to appointment, med list has been reviewed and updated based on patient's knowledge of their meds.      Electronically signed by:

## 2025-02-14 ENCOUNTER — PREP FOR SURGERY (OUTPATIENT)
Dept: OTHER | Facility: HOSPITAL | Age: 60
End: 2025-02-14
Payer: COMMERCIAL

## 2025-02-14 RX ORDER — SODIUM CHLORIDE 0.9 % (FLUSH) 0.9 %
10 SYRINGE (ML) INJECTION AS NEEDED
OUTPATIENT
Start: 2025-02-14

## 2025-02-14 RX ORDER — NITROGLYCERIN 0.4 MG/1
0.4 TABLET SUBLINGUAL
OUTPATIENT
Start: 2025-02-14

## 2025-02-14 RX ORDER — ASPIRIN 81 MG/1
81 TABLET ORAL DAILY
OUTPATIENT
Start: 2025-02-15

## 2025-02-14 RX ORDER — ASPIRIN 81 MG/1
324 TABLET, CHEWABLE ORAL ONCE
OUTPATIENT
Start: 2025-02-14 | End: 2025-02-14

## 2025-02-14 RX ORDER — SODIUM CHLORIDE 9 MG/ML
40 INJECTION, SOLUTION INTRAVENOUS AS NEEDED
OUTPATIENT
Start: 2025-02-14

## 2025-02-14 RX ORDER — ACETAMINOPHEN 325 MG/1
650 TABLET ORAL EVERY 4 HOURS PRN
OUTPATIENT
Start: 2025-02-14

## 2025-02-14 RX ORDER — SODIUM CHLORIDE 0.9 % (FLUSH) 0.9 %
10 SYRINGE (ML) INJECTION EVERY 12 HOURS SCHEDULED
OUTPATIENT
Start: 2025-02-14

## 2025-02-19 ENCOUNTER — HOSPITAL ENCOUNTER (OUTPATIENT)
Facility: HOSPITAL | Age: 60
Setting detail: HOSPITAL OUTPATIENT SURGERY
Discharge: HOME | End: 2025-02-19
Attending: INTERNAL MEDICINE | Admitting: INTERNAL MEDICINE
Payer: MEDICARE

## 2025-02-19 ENCOUNTER — HOSPITAL ENCOUNTER (EMERGENCY)
Facility: HOSPITAL | Age: 60
Discharge: HOME | End: 2025-02-19
Attending: EMERGENCY MEDICINE | Admitting: EMERGENCY MEDICINE
Payer: COMMERCIAL

## 2025-02-19 VITALS
DIASTOLIC BLOOD PRESSURE: 85 MMHG | WEIGHT: 155 LBS | OXYGEN SATURATION: 99 % | TEMPERATURE: 98.5 F | RESPIRATION RATE: 18 BRPM | BODY MASS INDEX: 26.46 KG/M2 | HEIGHT: 64 IN | SYSTOLIC BLOOD PRESSURE: 153 MMHG | HEART RATE: 82 BPM

## 2025-02-19 VITALS
HEIGHT: 64 IN | OXYGEN SATURATION: 96 % | HEART RATE: 66 BPM | TEMPERATURE: 98.3 F | SYSTOLIC BLOOD PRESSURE: 113 MMHG | RESPIRATION RATE: 16 BRPM | WEIGHT: 156.6 LBS | DIASTOLIC BLOOD PRESSURE: 73 MMHG | BODY MASS INDEX: 26.73 KG/M2

## 2025-02-19 DIAGNOSIS — I25.10 CORONARY ARTERY DISEASE INVOLVING NATIVE HEART, UNSPECIFIED VESSEL OR LESION TYPE, UNSPECIFIED WHETHER ANGINA PRESENT: ICD-10-CM

## 2025-02-19 DIAGNOSIS — R07.9 CHEST PAIN, UNSPECIFIED TYPE: ICD-10-CM

## 2025-02-19 DIAGNOSIS — I10 PRIMARY HYPERTENSION: ICD-10-CM

## 2025-02-19 DIAGNOSIS — Z51.89 VISIT FOR WOUND CHECK: Primary | ICD-10-CM

## 2025-02-19 LAB
ALBUMIN SERPL-MCNC: 4.6 G/DL (ref 3.5–5.2)
ALBUMIN/GLOB SERPL: 1.8 G/DL
ALP SERPL-CCNC: 88 U/L (ref 39–117)
ALT SERPL W P-5'-P-CCNC: 12 U/L (ref 1–33)
ANION GAP SERPL CALCULATED.3IONS-SCNC: 10 MMOL/L (ref 5–15)
AST SERPL-CCNC: 14 U/L (ref 1–32)
BILIRUB SERPL-MCNC: 0.7 MG/DL (ref 0–1.2)
BUN SERPL-MCNC: 14 MG/DL (ref 6–20)
BUN/CREAT SERPL: 19.7 (ref 7–25)
CALCIUM SPEC-SCNC: 9.8 MG/DL (ref 8.6–10.5)
CHLORIDE SERPL-SCNC: 104 MMOL/L (ref 98–107)
CHOLEST SERPL-MCNC: 166 MG/DL (ref 0–200)
CO2 SERPL-SCNC: 28 MMOL/L (ref 22–29)
CREAT SERPL-MCNC: 0.71 MG/DL (ref 0.57–1)
DEPRECATED RDW RBC AUTO: 44.9 FL (ref 37–54)
EGFRCR SERPLBLD CKD-EPI 2021: 98.7 ML/MIN/1.73
ERYTHROCYTE [DISTWIDTH] IN BLOOD BY AUTOMATED COUNT: 12.9 % (ref 12.3–15.4)
GLOBULIN UR ELPH-MCNC: 2.6 GM/DL
GLUCOSE SERPL-MCNC: 94 MG/DL (ref 65–99)
HCT VFR BLD AUTO: 40.6 % (ref 34–46.6)
HDLC SERPL-MCNC: 86 MG/DL (ref 40–60)
HGB BLD-MCNC: 13.7 G/DL (ref 12–15.9)
LDLC SERPL CALC-MCNC: 69 MG/DL (ref 0–100)
LDLC/HDLC SERPL: 0.81 {RATIO}
MCH RBC QN AUTO: 32.2 PG (ref 26.6–33)
MCHC RBC AUTO-ENTMCNC: 33.7 G/DL (ref 31.5–35.7)
MCV RBC AUTO: 95.5 FL (ref 79–97)
PLATELET # BLD AUTO: 296 10*3/MM3 (ref 140–450)
PMV BLD AUTO: 8.5 FL (ref 6–12)
POTASSIUM SERPL-SCNC: 4 MMOL/L (ref 3.5–5.2)
PROT SERPL-MCNC: 7.2 G/DL (ref 6–8.5)
RBC # BLD AUTO: 4.25 10*6/MM3 (ref 3.77–5.28)
SODIUM SERPL-SCNC: 142 MMOL/L (ref 136–145)
TRIGL SERPL-MCNC: 51 MG/DL (ref 0–150)
VLDLC SERPL-MCNC: 11 MG/DL (ref 5–40)
WBC NRBC COR # BLD AUTO: 4.71 10*3/MM3 (ref 3.4–10.8)

## 2025-02-19 PROCEDURE — C1894 INTRO/SHEATH, NON-LASER: HCPCS | Performed by: INTERNAL MEDICINE

## 2025-02-19 PROCEDURE — 99283 EMERGENCY DEPT VISIT LOW MDM: CPT

## 2025-02-19 PROCEDURE — 85027 COMPLETE CBC AUTOMATED: CPT | Performed by: PHYSICIAN ASSISTANT

## 2025-02-19 PROCEDURE — 80053 COMPREHEN METABOLIC PANEL: CPT | Performed by: PHYSICIAN ASSISTANT

## 2025-02-19 PROCEDURE — C1769 GUIDE WIRE: HCPCS | Performed by: INTERNAL MEDICINE

## 2025-02-19 PROCEDURE — 25510000001 IOPAMIDOL PER 1 ML: Performed by: INTERNAL MEDICINE

## 2025-02-19 PROCEDURE — 25810000003 SODIUM CHLORIDE 0.9 % SOLUTION: Performed by: PHYSICIAN ASSISTANT

## 2025-02-19 PROCEDURE — 99282 EMERGENCY DEPT VISIT SF MDM: CPT

## 2025-02-19 PROCEDURE — 25010000002 NICARDIPINE 2.5 MG/ML SOLUTION: Performed by: INTERNAL MEDICINE

## 2025-02-19 PROCEDURE — 93458 L HRT ARTERY/VENTRICLE ANGIO: CPT | Performed by: INTERNAL MEDICINE

## 2025-02-19 PROCEDURE — 80061 LIPID PANEL: CPT | Performed by: PHYSICIAN ASSISTANT

## 2025-02-19 PROCEDURE — 25010000002 HEPARIN (PORCINE) PER 1000 UNITS: Performed by: INTERNAL MEDICINE

## 2025-02-19 PROCEDURE — 25010000002 MIDAZOLAM PER 1 MG: Performed by: INTERNAL MEDICINE

## 2025-02-19 PROCEDURE — 36415 COLL VENOUS BLD VENIPUNCTURE: CPT

## 2025-02-19 PROCEDURE — 25010000002 LIDOCAINE PF 1% 1 % SOLUTION: Performed by: INTERNAL MEDICINE

## 2025-02-19 RX ORDER — ISOSORBIDE MONONITRATE 30 MG/1
30 TABLET, EXTENDED RELEASE ORAL DAILY
Qty: 90 TABLET | Refills: 1 | Status: SHIPPED | OUTPATIENT
Start: 2025-02-19

## 2025-02-19 RX ORDER — SODIUM CHLORIDE 9 MG/ML
40 INJECTION, SOLUTION INTRAVENOUS AS NEEDED
Status: DISCONTINUED | OUTPATIENT
Start: 2025-02-19 | End: 2025-02-19 | Stop reason: HOSPADM

## 2025-02-19 RX ORDER — SODIUM CHLORIDE 0.9 % (FLUSH) 0.9 %
10 SYRINGE (ML) INJECTION AS NEEDED
Status: DISCONTINUED | OUTPATIENT
Start: 2025-02-19 | End: 2025-02-19 | Stop reason: HOSPADM

## 2025-02-19 RX ORDER — ASPIRIN 81 MG/1
81 TABLET ORAL DAILY
Status: DISCONTINUED | OUTPATIENT
Start: 2025-02-20 | End: 2025-02-19 | Stop reason: HOSPADM

## 2025-02-19 RX ORDER — ASPIRIN 81 MG/1
324 TABLET, CHEWABLE ORAL ONCE
Status: COMPLETED | OUTPATIENT
Start: 2025-02-19 | End: 2025-02-19

## 2025-02-19 RX ORDER — LIDOCAINE HYDROCHLORIDE 10 MG/ML
INJECTION, SOLUTION EPIDURAL; INFILTRATION; INTRACAUDAL; PERINEURAL
Status: DISCONTINUED | OUTPATIENT
Start: 2025-02-19 | End: 2025-02-19 | Stop reason: HOSPADM

## 2025-02-19 RX ORDER — ACETAMINOPHEN 325 MG/1
650 TABLET ORAL EVERY 4 HOURS PRN
Status: DISCONTINUED | OUTPATIENT
Start: 2025-02-19 | End: 2025-02-19 | Stop reason: HOSPADM

## 2025-02-19 RX ORDER — HEPARIN SODIUM 1000 [USP'U]/ML
INJECTION, SOLUTION INTRAVENOUS; SUBCUTANEOUS
Status: DISCONTINUED | OUTPATIENT
Start: 2025-02-19 | End: 2025-02-19 | Stop reason: HOSPADM

## 2025-02-19 RX ORDER — NICARDIPINE HYDROCHLORIDE 2.5 MG/ML
INJECTION INTRAVENOUS
Status: DISCONTINUED | OUTPATIENT
Start: 2025-02-19 | End: 2025-02-19 | Stop reason: HOSPADM

## 2025-02-19 RX ORDER — SODIUM CHLORIDE 9 MG/ML
100 INJECTION, SOLUTION INTRAVENOUS CONTINUOUS
Status: DISCONTINUED | OUTPATIENT
Start: 2025-02-19 | End: 2025-02-19 | Stop reason: HOSPADM

## 2025-02-19 RX ORDER — SODIUM CHLORIDE 0.9 % (FLUSH) 0.9 %
10 SYRINGE (ML) INJECTION EVERY 12 HOURS SCHEDULED
Status: DISCONTINUED | OUTPATIENT
Start: 2025-02-19 | End: 2025-02-19 | Stop reason: HOSPADM

## 2025-02-19 RX ORDER — IOPAMIDOL 755 MG/ML
INJECTION, SOLUTION INTRAVASCULAR
Status: DISCONTINUED | OUTPATIENT
Start: 2025-02-19 | End: 2025-02-19 | Stop reason: HOSPADM

## 2025-02-19 RX ORDER — NITROGLYCERIN 0.4 MG/1
0.4 TABLET SUBLINGUAL
Status: DISCONTINUED | OUTPATIENT
Start: 2025-02-19 | End: 2025-02-19 | Stop reason: HOSPADM

## 2025-02-19 RX ORDER — MIDAZOLAM HYDROCHLORIDE 1 MG/ML
INJECTION, SOLUTION INTRAMUSCULAR; INTRAVENOUS
Status: DISCONTINUED | OUTPATIENT
Start: 2025-02-19 | End: 2025-02-19 | Stop reason: HOSPADM

## 2025-02-19 RX ADMIN — ASPIRIN 81 MG 324 MG: 81 TABLET ORAL at 09:48

## 2025-02-19 RX ADMIN — SODIUM CHLORIDE 213.6 ML: 9 INJECTION, SOLUTION INTRAVENOUS at 09:48

## 2025-02-19 NOTE — ED PROVIDER NOTES
Subjective   History of Present Illness  59-year-old female presents emergency department today with a right wrist there is bleeding.  She had a heart catheter this morning.  She was watched and did well.  She went back to her hotel and began bleeding from the site.  She went back up to [facility] and they put a pressure dressing on this.    History provided by:  Patient   used: No    Wound Check  Location:  Right wrist has a pressure dressing.  No active bleeding  Chronicity:  Recurrent  Associated symptoms: no chest pain        Review of Systems   Cardiovascular:  Negative for chest pain.       Past Medical History:   Diagnosis Date    Chronic fatigue     Fibromyalgia     Heart attack        No Known Allergies    Past Surgical History:   Procedure Laterality Date    CORONARY STENT PLACEMENT      D & C AND LAPAROSCOPY         Family History   Problem Relation Age of Onset    Heart attack Mother     Atrial fibrillation Father        Social History     Socioeconomic History    Marital status:    Tobacco Use    Smoking status: Former     Types: Cigarettes    Smokeless tobacco: Never   Substance and Sexual Activity    Alcohol use: Yes     Alcohol/week: 1.0 - 2.0 standard drink of alcohol     Types: 1 - 2 Cans of beer per week    Drug use: Never    Sexual activity: Defer           Objective   Physical Exam  Vitals and nursing note reviewed.   Constitutional:       General: She is not in acute distress.     Appearance: She is well-developed. She is not diaphoretic.   HENT:      Head: Normocephalic and atraumatic.      Nose: Nose normal.   Eyes:      General: No scleral icterus.     Conjunctiva/sclera: Conjunctivae normal.   Pulmonary:      Effort: Pulmonary effort is normal. No respiratory distress.   Musculoskeletal:         General: Normal range of motion.      Cervical back: Normal range of motion and neck supple.   Skin:     General: Skin is warm and dry.      Comments: Right wrist has a  pressure dressing no active bleeding.   Neurological:      Mental Status: She is alert and oriented to person, place, and time.   Psychiatric:         Behavior: Behavior normal.         Procedures           ED Course  ED Course as of 02/19/25 1752   Wed Feb 19, 2025   1635 Discussed with Esther in the [facility].  The bleed started put ostomy we can remove 2 to 3 mL of air at 1630 she had no rebleed.  Will continue this until it is empty which which should take about 45 minutes. [MARCIN]   1751 Air was removed from the pressure dressing 2 mL at a time.  This is done over about an hour period.  She has a pressure dressing with Surgifoam and a Tegaderm.  No actively should be discharged [MARCIN]   1752 . [MARCIN]      ED Course User Index  [MARCIN] Guillermo Cervantes PA                                                       Medical Decision Making  Problems Addressed:  Visit for wound check: acute illness or injury        Final diagnoses:   Visit for wound check       ED Disposition  ED Disposition       ED Disposition   Discharge    Condition   Stable    Comment   --               [facility] EMERGENCY DEPARTMENT  [address] [address] [phone]    Call for appointment         Medication List      No changes were made to your prescriptions during this visit.            Guillermo Cervantes PA  02/20/25 1038

## 2025-02-19 NOTE — INTERVAL H&P NOTE
H&P reviewed. The patient was examined and there are no changes to the H&P.      Plan: Left heart cath plus or minus catheter-based intervention  Access: Right radial artery, positive Barbeau test    Risk and benefits have been discussed with the patient, patient wishes to proceed.    DEMETRIUS Nguyen         No There are no Wet Read(s) to document.

## 2025-02-20 ENCOUNTER — DOCUMENTATION (OUTPATIENT)
Dept: CARDIAC REHAB | Facility: HOSPITAL | Age: 60
End: 2025-02-20
Payer: COMMERCIAL

## 2025-02-27 NOTE — NURSING NOTE
Approximately 1600 pt. Returned to cvou waiting room post being discharged earlier that day. Pt. Right radial arterial site was actively bleeding; pt. Had arm wrapped in towel. Held manual pressure and applied TR band on right wrist. Hemostasis achieved. Walked pt. And her  to the ER for admission.

## 2025-03-06 ENCOUNTER — OFFICE VISIT (OUTPATIENT)
Dept: CARDIOLOGY | Facility: CLINIC | Age: 60
End: 2025-03-06
Payer: MEDICARE

## 2025-03-06 VITALS
HEART RATE: 81 BPM | DIASTOLIC BLOOD PRESSURE: 82 MMHG | HEIGHT: 64 IN | OXYGEN SATURATION: 99 % | BODY MASS INDEX: 26.8 KG/M2 | SYSTOLIC BLOOD PRESSURE: 137 MMHG | WEIGHT: 157 LBS

## 2025-03-06 DIAGNOSIS — I10 PRIMARY HYPERTENSION: ICD-10-CM

## 2025-03-06 DIAGNOSIS — E78.00 PURE HYPERCHOLESTEROLEMIA: ICD-10-CM

## 2025-03-06 DIAGNOSIS — I25.10 CORONARY ARTERY DISEASE INVOLVING NATIVE HEART, UNSPECIFIED VESSEL OR LESION TYPE, UNSPECIFIED WHETHER ANGINA PRESENT: Primary | ICD-10-CM

## 2025-03-06 PROCEDURE — 99214 OFFICE O/P EST MOD 30 MIN: CPT | Performed by: PHYSICIAN ASSISTANT

## 2025-03-06 PROCEDURE — 3075F SYST BP GE 130 - 139MM HG: CPT | Performed by: PHYSICIAN ASSISTANT

## 2025-03-06 PROCEDURE — 3079F DIAST BP 80-89 MM HG: CPT | Performed by: PHYSICIAN ASSISTANT

## 2025-03-06 NOTE — PROGRESS NOTES
Problem list     Subjective   Lucy Mac is a 59 y.o. female     Chief Complaint   Patient presents with    Cath follow up     Chest pain     Problem list  1.  Coronary artery disease  1.1 history of myocardial infarction's, inadequate data  1.2 cardiac catheterizations in 2013 by Dr. Marquez with stenting x 3, myocardial bridging noted.  Per patient report, inadequate data  1.3 stress test February 2025 with a large defect involving anteroseptal, anterior and anterolateral wall.  Attenuation versus ischemia with normal systolic function noted  1.4 cardiac catheterization February 2025 in [place] with widely patent stents, no obstructive disease with bridging noted  2.  Hypertension  3.  Dyslipidemia    HPI    Patient is a 59-year-old female presenting back to the office for follow-up after having catheterization.  She has done remarkably well.  She has no complaints of chest pain or pressure.  She does describe a degree of fatigue.  She does not describe dyspnea.  No PND or orthopnea.    She does not describe palpitating.  No complaints of dizziness, presyncope, or syncope.  Patient is stable otherwise.      Current Outpatient Medications on File Prior to Visit   Medication Sig Dispense Refill    albuterol sulfate  (90 Base) MCG/ACT inhaler       amLODIPine (NORVASC) 5 MG tablet Take 1 tablet by mouth Daily.      aspirin 81 MG EC tablet Take 1 tablet by mouth Daily. 30 tablet 5    diphenhydrAMINE-APAP, sleep, (TYLENOL PM EXTRA STRENGTH PO) Take  by mouth.      famotidine (PEPCID) 40 MG tablet       gabapentin (NEURONTIN) 300 MG capsule Daily.      nitroglycerin (NITROSTAT) 0.4 MG SL tablet 1 under the tongue as needed for angina, may repeat q5mins for up three doses 25 tablet 11    pravastatin (Pravachol) 20 MG tablet Take 1 tablet by mouth Every Night. 90 tablet 3    Zolpidem Tartrate (AMBIEN PO) Take  by mouth.      [DISCONTINUED] isosorbide mononitrate (IMDUR) 30 MG 24 hr tablet Take 1 tablet  "by mouth Daily. 90 tablet 1     No current facility-administered medications on file prior to visit.       Patient has no known allergies.    Past Medical History:   Diagnosis Date    Chronic fatigue     Fibromyalgia     Heart attack        Social History     Socioeconomic History    Marital status:    Tobacco Use    Smoking status: Former     Types: Cigarettes    Smokeless tobacco: Never   Substance and Sexual Activity    Alcohol use: Yes     Alcohol/week: 1.0 - 2.0 standard drink of alcohol     Types: 1 - 2 Cans of beer per week    Drug use: Never    Sexual activity: Defer       Family History   Problem Relation Age of Onset    Heart attack Mother     Atrial fibrillation Father        Review of Systems   Constitutional: Negative.  Negative for activity change, appetite change, chills, fatigue and fever.   HENT: Negative.  Negative for congestion.    Eyes:  Negative for visual disturbance.   Respiratory:  Negative for apnea, cough, chest tightness, shortness of breath and wheezing.    Cardiovascular:  Positive for chest pain. Negative for palpitations and leg swelling.   Gastrointestinal: Negative.  Negative for blood in stool.   Endocrine: Negative.  Negative for cold intolerance and heat intolerance.   Genitourinary: Negative.  Negative for hematuria.   Musculoskeletal: Negative.  Negative for gait problem.   Skin: Negative.  Negative for color change, rash and wound.   Allergic/Immunologic: Negative.  Negative for environmental allergies and food allergies.   Neurological:  Positive for light-headedness. Negative for dizziness, syncope, weakness, numbness and headaches.   Hematological:  Bruises/bleeds easily (Bruises).   Psychiatric/Behavioral: Negative.  Negative for sleep disturbance.        Objective   Vitals:    03/06/25 1014   BP: 137/82   BP Location: Right arm   Patient Position: Sitting   Cuff Size: Adult   Pulse: 81   SpO2: 99%   Weight: 71.2 kg (157 lb)   Height: 162.6 cm (64\")      /82 " "(BP Location: Right arm, Patient Position: Sitting, Cuff Size: Adult)   Pulse 81   Ht 162.6 cm (64\")   Wt 71.2 kg (157 lb)   SpO2 99%   BMI 26.95 kg/m²     Lab Results (most recent)       None            Physical Exam  Vitals and nursing note reviewed.   Constitutional:       General: She is not in acute distress.     Appearance: Normal appearance. She is well-developed.   HENT:      Head: Normocephalic and atraumatic.   Eyes:      General: No scleral icterus.        Right eye: No discharge.         Left eye: No discharge.      Conjunctiva/sclera: Conjunctivae normal.   Neck:      Vascular: No carotid bruit.   Cardiovascular:      Rate and Rhythm: Normal rate and regular rhythm.      Heart sounds: Normal heart sounds. No murmur heard.     No friction rub. No gallop.   Pulmonary:      Effort: Pulmonary effort is normal. No respiratory distress.      Breath sounds: Normal breath sounds. No wheezing or rales.   Chest:      Chest wall: No tenderness.   Musculoskeletal:      Right lower leg: No edema.      Left lower leg: No edema.   Skin:     General: Skin is warm and dry.      Coloration: Skin is not pale.      Findings: No erythema or rash.   Neurological:      Mental Status: She is alert and oriented to person, place, and time.      Cranial Nerves: No cranial nerve deficit.   Psychiatric:         Behavior: Behavior normal.         Procedure   Procedures       Assessment & Plan     Problems Addressed this Visit          Cardiac and Vasculature    Coronary artery disease involving native heart - Primary    Primary hypertension    Pure hypercholesterolemia     Diagnoses         Codes Comments    Coronary artery disease involving native heart, unspecified vessel or lesion type, unspecified whether angina present    -  Primary ICD-10-CM: I25.10  ICD-9-CM: 414.01     Primary hypertension     ICD-10-CM: I10  ICD-9-CM: 401.9     Pure hypercholesterolemia     ICD-10-CM: E78.00  ICD-9-CM: 272.0       "     Recommendations  1.  Patient is a 59-year-old female presenting back to the office for routine assessment and for follow-up from catheterization.  Catheterization demonstrating widely patent stents with no obstructive disease with small amount of myocardial bridging.  For now, nothing further.  She is doing well without chest pain.    2.  Patient with baseline hypertension doing well.  Will continue medical therapy at this time.    3.  Patient with dyslipidemia on statin therapy.  Lipids are controlled per last review.  We will make no changes.    4.  We will see her back for follow-up in 6 months or sooner if needed.  Follow-up with primary as scheduled.             Lucy Jayjosé miguel Bubba  reports that she has quit smoking. Her smoking use included cigarettes. She has never used smokeless tobacco.       Patient did not bring med list or medicine bottles to appointment, med list has been reviewed and updated based on patient's knowledge of their meds.      Electronically signed by:

## 2025-03-06 NOTE — LETTER
March 6, 2025     Nishant Yi MD  [address] [place] [address]  [address]  [facility] [address]    Patient: Lucy Mac   YOB: 1965   Date of Visit: 3/6/2025       Dear Nishant Yi MD    Lucy Mac was in my office today. Below is a copy of my note.    If you have questions, please do not hesitate to call me. I look forward to following Lucy along with you.         Sincerely,        MATHEUS Sandra        CC: No Recipients    Problem list     Subjective  Lucy Mac is a 59 y.o. female     Chief Complaint   Patient presents with   • Cath follow up     Chest pain     Problem list  1.  Coronary artery disease  1.1 history of myocardial infarction's, inadequate data  1.2 cardiac catheterizations in 2013 by Dr. Marquez with stenting x 3, myocardial bridging noted.  Per patient report, inadequate data  1.3 stress test February 2025 with a large defect involving anteroseptal, anterior and anterolateral wall.  Attenuation versus ischemia with normal systolic function noted  1.4 cardiac catheterization February 2025 in [place] with widely patent stents, no obstructive disease with bridging noted  2.  Hypertension  3.  Dyslipidemia    HPI    Patient is a 59-year-old female presenting back to the office for follow-up after having catheterization.  She has done remarkably well.  She has no complaints of chest pain or pressure.  She does describe a degree of fatigue.  She does not describe dyspnea.  No PND or orthopnea.    She does not describe palpitating.  No complaints of dizziness, presyncope, or syncope.  Patient is stable otherwise.      Current Outpatient Medications on File Prior to Visit   Medication Sig Dispense Refill   • albuterol sulfate  (90 Base) MCG/ACT inhaler      • amLODIPine (NORVASC) 5 MG tablet Take 1 tablet by mouth Daily.     • aspirin 81 MG EC tablet Take 1 tablet by mouth Daily. 30 tablet 5   • diphenhydrAMINE-APAP, sleep, (TYLENOL PM EXTRA  STRENGTH PO) Take  by mouth.     • famotidine (PEPCID) 40 MG tablet      • gabapentin (NEURONTIN) 300 MG capsule Daily.     • nitroglycerin (NITROSTAT) 0.4 MG SL tablet 1 under the tongue as needed for angina, may repeat q5mins for up three doses 25 tablet 11   • pravastatin (Pravachol) 20 MG tablet Take 1 tablet by mouth Every Night. 90 tablet 3   • Zolpidem Tartrate (AMBIEN PO) Take  by mouth.     • [DISCONTINUED] isosorbide mononitrate (IMDUR) 30 MG 24 hr tablet Take 1 tablet by mouth Daily. 90 tablet 1     No current facility-administered medications on file prior to visit.       Patient has no known allergies.    Past Medical History:   Diagnosis Date   • Chronic fatigue    • Fibromyalgia    • Heart attack        Social History     Socioeconomic History   • Marital status:    Tobacco Use   • Smoking status: Former     Types: Cigarettes   • Smokeless tobacco: Never   Substance and Sexual Activity   • Alcohol use: Yes     Alcohol/week: 1.0 - 2.0 standard drink of alcohol     Types: 1 - 2 Cans of beer per week   • Drug use: Never   • Sexual activity: Defer       Family History   Problem Relation Age of Onset   • Heart attack Mother    • Atrial fibrillation Father        Review of Systems   Constitutional: Negative.  Negative for activity change, appetite change, chills, fatigue and fever.   HENT: Negative.  Negative for congestion.    Eyes:  Negative for visual disturbance.   Respiratory:  Negative for apnea, cough, chest tightness, shortness of breath and wheezing.    Cardiovascular:  Positive for chest pain. Negative for palpitations and leg swelling.   Gastrointestinal: Negative.  Negative for blood in stool.   Endocrine: Negative.  Negative for cold intolerance and heat intolerance.   Genitourinary: Negative.  Negative for hematuria.   Musculoskeletal: Negative.  Negative for gait problem.   Skin: Negative.  Negative for color change, rash and wound.   Allergic/Immunologic: Negative.  Negative for  "environmental allergies and food allergies.   Neurological:  Positive for light-headedness. Negative for dizziness, syncope, weakness, numbness and headaches.   Hematological:  Bruises/bleeds easily (Bruises).   Psychiatric/Behavioral: Negative.  Negative for sleep disturbance.        Objective  Vitals:    03/06/25 1014   BP: 137/82   BP Location: Right arm   Patient Position: Sitting   Cuff Size: Adult   Pulse: 81   SpO2: 99%   Weight: 71.2 kg (157 lb)   Height: 162.6 cm (64\")      /82 (BP Location: Right arm, Patient Position: Sitting, Cuff Size: Adult)   Pulse 81   Ht 162.6 cm (64\")   Wt 71.2 kg (157 lb)   SpO2 99%   BMI 26.95 kg/m²     Lab Results (most recent)       None            Physical Exam  Vitals and nursing note reviewed.   Constitutional:       General: She is not in acute distress.     Appearance: Normal appearance. She is well-developed.   HENT:      Head: Normocephalic and atraumatic.   Eyes:      General: No scleral icterus.        Right eye: No discharge.         Left eye: No discharge.      Conjunctiva/sclera: Conjunctivae normal.   Neck:      Vascular: No carotid bruit.   Cardiovascular:      Rate and Rhythm: Normal rate and regular rhythm.      Heart sounds: Normal heart sounds. No murmur heard.     No friction rub. No gallop.   Pulmonary:      Effort: Pulmonary effort is normal. No respiratory distress.      Breath sounds: Normal breath sounds. No wheezing or rales.   Chest:      Chest wall: No tenderness.   Musculoskeletal:      Right lower leg: No edema.      Left lower leg: No edema.   Skin:     General: Skin is warm and dry.      Coloration: Skin is not pale.      Findings: No erythema or rash.   Neurological:      Mental Status: She is alert and oriented to person, place, and time.      Cranial Nerves: No cranial nerve deficit.   Psychiatric:         Behavior: Behavior normal.         Procedure  Procedures       Assessment & Plan    Problems Addressed this Visit          " Cardiac and Vasculature    Coronary artery disease involving native heart - Primary    Primary hypertension    Pure hypercholesterolemia     Diagnoses         Codes Comments    Coronary artery disease involving native heart, unspecified vessel or lesion type, unspecified whether angina present    -  Primary ICD-10-CM: I25.10  ICD-9-CM: 414.01     Primary hypertension     ICD-10-CM: I10  ICD-9-CM: 401.9     Pure hypercholesterolemia     ICD-10-CM: E78.00  ICD-9-CM: 272.0           Recommendations  1.  Patient is a 59-year-old female presenting back to the office for routine assessment and for follow-up from catheterization.  Catheterization demonstrating widely patent stents with no obstructive disease with small amount of myocardial bridging.  For now, nothing further.  She is doing well without chest pain.    2.  Patient with baseline hypertension doing well.  Will continue medical therapy at this time.    3.  Patient with dyslipidemia on statin therapy.  Lipids are controlled per last review.  We will make no changes.    4.  We will see her back for follow-up in 6 months or sooner if needed.  Follow-up with primary as scheduled.             Lucy Mac  reports that she has quit smoking. Her smoking use included cigarettes. She has never used smokeless tobacco.       Patient did not bring med list or medicine bottles to appointment, med list has been reviewed and updated based on patient's knowledge of their meds.      Electronically signed by:

## (undated) DEVICE — MODEL BT2000 P/N 700287-012KIT CONTENTS: MANIFOLD WITH SALINE AND CONTRAST PORTS, SALINE TUBING WITH SPIKE AND HAND SYRINGE, TRANSDUCER: Brand: BT2000 AUTOMATED MANIFOLD KIT

## (undated) DEVICE — MODEL AT P65, P/N 701554-001KIT CONTENTS: HAND CONTROLLER, 3-WAY HIGH-PRESSURE STOPCOCK WITH ROTATING END AND PREMIUM HIGH-PRESSURE TUBING: Brand: ANGIOTOUCH® KIT

## (undated) DEVICE — INTRO SHEATH PRELUDE IDEAL SPRNG COIL 021 6F 23X80CM

## (undated) DEVICE — ADULT, W/LG. BACK PAD, RADIOTRANSPARENT ELEMENT AND LEAD WIRE COMPATIBLE W/: Brand: DEFIBRILLATION ELECTRODES

## (undated) DEVICE — CATH DIAG EXPO M/ PK 5F FL4/FR4 PIG

## (undated) DEVICE — TR BAND RADIAL ARTERY COMPRESSION DEVICE: Brand: TR BAND

## (undated) DEVICE — CATH DIAG EXPO .045 FL3  5F 100CM

## (undated) DEVICE — PK CATH CARD 10

## (undated) DEVICE — GW INQWIRE FC PTFE STD J/1.5 .035 260